# Patient Record
Sex: FEMALE | Race: WHITE
[De-identification: names, ages, dates, MRNs, and addresses within clinical notes are randomized per-mention and may not be internally consistent; named-entity substitution may affect disease eponyms.]

---

## 2018-01-16 ENCOUNTER — HOSPITAL ENCOUNTER (OUTPATIENT)
Dept: HOSPITAL 56 - MW.SDS | Age: 68
LOS: 1 days | Discharge: HOME | End: 2018-01-17
Attending: OBSTETRICS & GYNECOLOGY
Payer: MEDICARE

## 2018-01-16 DIAGNOSIS — Z79.899: ICD-10-CM

## 2018-01-16 DIAGNOSIS — R11.2: ICD-10-CM

## 2018-01-16 DIAGNOSIS — N84.0: ICD-10-CM

## 2018-01-16 DIAGNOSIS — E78.00: ICD-10-CM

## 2018-01-16 DIAGNOSIS — Z98.51: ICD-10-CM

## 2018-01-16 DIAGNOSIS — N81.3: Primary | ICD-10-CM

## 2018-01-16 DIAGNOSIS — Z79.82: ICD-10-CM

## 2018-01-16 DIAGNOSIS — Z98.890: ICD-10-CM

## 2018-01-16 LAB
CHLORIDE SERPL-SCNC: 108 MMOL/L (ref 98–110)
SODIUM SERPL-SCNC: 141 MMOL/L (ref 136–146)

## 2018-01-16 PROCEDURE — 86901 BLOOD TYPING SEROLOGIC RH(D): CPT

## 2018-01-16 PROCEDURE — 85025 COMPLETE CBC W/AUTO DIFF WBC: CPT

## 2018-01-16 PROCEDURE — 86850 RBC ANTIBODY SCREEN: CPT

## 2018-01-16 PROCEDURE — 36415 COLL VENOUS BLD VENIPUNCTURE: CPT

## 2018-01-16 PROCEDURE — 86900 BLOOD TYPING SEROLOGIC ABO: CPT

## 2018-01-16 PROCEDURE — 85027 COMPLETE CBC AUTOMATED: CPT

## 2018-01-16 PROCEDURE — 58263 VAG HYST W/T/O & VAG REPAIR: CPT

## 2018-01-16 PROCEDURE — 88307 TISSUE EXAM BY PATHOLOGIST: CPT

## 2018-01-16 PROCEDURE — 80048 BASIC METABOLIC PNL TOTAL CA: CPT

## 2018-01-16 RX ADMIN — ONDANSETRON PRN MG: 2 INJECTION, SOLUTION INTRAMUSCULAR; INTRAVENOUS at 15:27

## 2018-01-16 RX ADMIN — ONDANSETRON PRN MG: 2 INJECTION, SOLUTION INTRAMUSCULAR; INTRAVENOUS at 22:40

## 2018-01-16 RX ADMIN — PROMETHAZINE HYDROCHLORIDE PRN MG: 25 INJECTION INTRAMUSCULAR; INTRAVENOUS at 17:10

## 2018-01-16 NOTE — PCM.SN
- Free Text/Narrative


Note: 





Patient's pain is controlled overall--had emesis earlier, nausea is improving. 

VS stable overall--BPs mildly elevated.  Explained intraoperative findings, 

procedure. Continue postoperative cares and provide antiemetics as indicated.  

Plan removal of vaginal pack and hope in the morning.

## 2018-01-16 NOTE — PCM.OPNOTE
- General Post-Op/Procedure Note


Date of Surgery/Procedure: 01/16/18


Operative Procedure(s): TVH/BSO/anterior repair/culdoplasty/cystoscopy


Findings: 





3rd degree uterine prolapse and cystocele. Bilateral patent ureters


Pre Op Diagnosis: Incomplete uterovaginal prolapse


Post-Op Diagnosis: Same


Anesthesia Technique: General ET Tube


Primary Surgeon: Heather Alfaro


Assistant: Lilli Christensen


Pathology: 





uterus, tubes, ovaries, vaginal mucosa


Fluid Replacement, Intraop: 1,800


EBL in mLs: 200


Complications: none known


Condition: Good


Free Text/Narrative:: 





Dictation [id number]

## 2018-01-16 NOTE — OR
SURGEON:

Heather Alfaro M.D.

 

DATE OF PROCEDURE:  01/16/2018

 

PREOPERATIVE DIAGNOSIS:

Incomplete uterovaginal prolapse, symptomatic.

 

POSTOPERATIVE DIAGNOSIS:

Incomplete uterovaginal prolapse, symptomatic.

 

PROCEDURE:

1. Total vaginal hysterectomy and bilateral salpingo-oophorectomy.

2. Anterior colporrhaphy.

3. Olson's culdoplasty and cystoscopy.

 

ANESTHESIA:

General endotracheal anesthesia.

 

ASSISTANT:

Lilli Christensen M.D.

 

ESTIMATED BLOOD LOSS:

200 mL.

 

FLUIDS:

1800 mL crystalloid.

 

COMPLICATIONS:

None known.

 

FINDINGS:

Third-degree uterine prolapse and cystocele.  Bilateral patent ureters with

cystoscopy, post procedure.

 

DISPOSITION:

The patient to PACU in stable.

 

SPECIMENS:

Pathology.

 

INDICATIONS:

Elaina is a 67-year-old postmenopausal female, who has ongoing difficulties with

pelvic organ prolapse.  At this time, she would like to proceed with surgical

intervention.  Risks of the procedure have been discussed with her.  Proper

consent obtained.

 

The patient was taken to the operating room, where she underwent general

endotracheal anesthesia.  She was placed in modified dorsal position prepped and

draped in the usual sterile fashion.  SCDs lower extremities.  Yanes to gravity.

Received Ancef prophylactically.

 

A time-out was performed.  A heavyweight speculum was placed posteriorly to the

vagina, anterior [place].  Cervix was grasped with Kenroy clamp and circumscribed

with Bovie cautery.  Anterior and posterior and side wall mucosa was dissected

away from underlying peritoneum bluntly.  The posterior peritoneum was tented

downward and entered sharply.  A longer weighted speculum was replaced with the

shorter anteriorly.  A sharp dissection was performed with Metzenbaum scissors

to enter the anterior cul-de-sac.  [place] was now placed to mobilize the bladder

away from operative field.

 

Katie clamp was placed on either sides, along the uterosacral ligaments,

transected and suture ligated with 2-0 Vicryl.  Further pedicle on either side

was able be secured, transected, and suture ligated in the final pedicle on

either side, incorporating the utero tubo-ovarian pedicle was able to be

secured, transected, and suture ligated.  The uterus was handed off to scrub

tech.

 

The moist vaginal packing was now placed to mobilize the bowel away from

operative field.  The right infundibulopelvic ligament was able to be

identified.  The tube and ovary were grasped with a Jaskaran clamp and Z-clamp x2

was placed across the pedicle.  The pedicle was now transected and suture

ligated with 2-0 Vicryl on pass followed by suture.  The pedicle was inspected

and found to be hemostatic.

 

A similar process was performed on the patient's left side.  The

infundibulopelvic pelvic ligament was able to be identified, transected with the

Z-clamp x2, cut and suture tied x2.  These pedicles were closely inspected,

found to be hemostatic, and sutures trimmed.

 

The uterosacral ligament pedicle on either side was secured to the vaginal apex

in order to help decrease risk for enterocele forming.  Olson's culdoplasty was

performed.  The 2-0 Vicryl incorporating the left uterosacral ligament was able

to be reefed along the posterior peritoneum and then incorporating the right

uterosacral ligament in a similar fashion was performed on further posterior

reefing of the peritoneum just cephalad to this previous suture.  Attention was

now turned to performing the anterior repair as the pedicles appeared hemostatic

along the hysterectomy.

 

Along the midline, the anterior mucosa was able to be grasped with Allis clamps.

Hydrodissection was performed along the midline.  A sagittal midline anterior

mucosal incision was created using Metzenbaum scissors.  This was further

dissected.  The edges of the mucosa were now grasped on either side with serial

Allis clamps.  The overlying mucosa was gently dissected sharply and bluntly

from underlying muscularis tissue until a stronger lateral muscularis tissue was

able to be identified.  This tissue was now replicated using 2-0 Vicryl in

inverted mattress suture technique in order to reduce the cystocele defect.

 

Sutures trimmed, excess vaginal mucosa was trimmed.  The edges of mucosa were

now replicated using 0 Vicryl in continuous running locked fashion; one to the

level of the vaginal cuff.  The Olson's culdoplasty sutures were tied down x2.

The remainder of the vaginal mucosa was now closed along the cuff and the cuff

line was inspected and found to be hemostatic.  All sutures trimmed.  The Yanes

balloon was deflated, Yanes catheter removed, and cystoscope was introduced. IV

porcine with Lasix had been introduced via IV.  The bladder was able to be

visualized.  The dome of the bladder was found to be intact.  Trigone was

inspected.  The right ureteral orifice followed by the left ureteral orifice had

fluorescein dyed urine seen streaming from them, helping to ensure ureteral

patency.  Therefore, the bladder was drained.  Yanes catheter was replaced.  The

cuff line once again inspected and found to be hemostatic.  Vaginal packing was

placed.  The patient tolerated the procedure well.  She will go to PACU in

stable condition.  All specimens to pathology.  Sponge, instrument, and needle

counts were correct x2.

 

 

DEBBIE / MELE

DD:  01/16/2018 10:40:41

DT:  01/16/2018 16:05:50

Job #:  [id number]

## 2018-01-16 NOTE — PCM.PREANE
Preanesthetic Assessment





- Anesthesia/Transfusion/Family Hx


Anesthesia History: Prior Anesthesia Reaction


Type of Anesthesia Reaction: Excessive Nausea/Vomiting


Family History of Anesthesia Reaction: No


Transfusion History: No Prior Transfusion(s)


Intubation History: Unknown





- Review of Systems


General: No Symptoms


Pulmonary: No Symptoms


Cardiovascular: No Symptoms


Gastrointestinal: No Symptoms


Neurological: No Symptoms


Other: Reports: None





- Physical Assessment


O2 Sat by Pulse Oximetry: 95


Respiratory Rate: 16


Vital Signs: 





 Last Vital Signs











Temp  36.4 C   01/16/18 07:41


 


Pulse  74   01/16/18 07:41


 


Resp  16   01/16/18 07:41


 


BP  141/67 H  01/16/18 07:41


 


Pulse Ox  95   01/16/18 07:41











Height: 1.6 m


Weight: 71.668 kg


ASA Class: 2


Mental Status: Alert & Oriented x3


Airway Class: Mallampati = 2


Dentition: Reports: Normal Dentition


Thyro-Mental Finger Breadths: 3


Mouth Opening Finger Breadths: 3


ROM/Head Extension: Full


Lungs: Clear to Auscultation, Normal Respiratory Effort


Cardiovascular: Regular Rate, Regular Rhythm





- Allergies


Allergies/Adverse Reactions: 


 Allergies











Allergy/AdvReac Type Severity Reaction Status Date / Time


 


No Known Allergies Allergy   Verified 01/16/18 07:43














- Blood


Blood Available: No





- Anesthesia Plan


Pre-Op Medication Ordered: None





- Acknowledgements


Anesthesia Type Planned: General Anesthesia


Pt an Appropriate Candidate for the Planned Anesthesia: Yes


Alternatives and Risks of Anesthesia Discussed w Pt/Guardian: Yes


Pt/Guardian Understands and Agrees with Anesthesia Plan: Yes





PreAnesthesia Questionnaire


HEENT History: Reports: Glaucoma


Other HEENT History: wears glasses


Cardiovascular History: Reports: Other (See Below) (borderline 

hypercholesteronemia)


Gastrointestinal History: Reports: Other (See Below)


Other Gastrointestinal History: occasional heartburn


Genitourinary History: Reports: None


OB/GYN History: Reports: Pregnancy, Prolapsed Uterus





- Past Surgical History


Head Surgeries/Procedures: Reports: None


HEENT Surgical History: Reports: Oral Surgery


GI Surgical History: Reports: Colonoscopy (x2)


Female  Surgical History: Reports: Breast Biopsy, Tubal Ligation





- SUBSTANCE USE


Smoking Status *Q: Never Smoker


Recreational Drug Use History: No





- HOME MEDS


Home Medications: 


 Home Meds





Aspirin [[place] Aspirin] 81 mg PO DAILY 01/11/18 [History]


Brimonidine/Timolol [Combigan 0.2%/0.5% Ophth Soln] 1 drop EYEBOTH BID 01/11/18 

[History]


Ibuprofen 1 tab PO ASDIRECTED PRN 01/11/18 [History]


Latanoprost [Xalatan 0.005% Ophth Soln] 1 drop EYEBOTH BEDTIME 01/11/18 [History

]











- CURRENT (IN HOUSE) MEDS


Current Meds: 





 Current Medications





Fentanyl (Sublimaze)  50 mcg IVPUSH .Q5MIN PRN


   PRN Reason: Pain


Acetaminophen 1,000 mg/ Premix  100 mls @ 400 mls/hr IV .ONETIME GUALBERTO


   Last Admin: 01/16/18 08:02 Dose:  400 mls/hr


Scopolamine (Transderm-Scop)  1.5 mg TRDERM .ONCE PRN


   PRN Reason: Post Op Nausea


Sodium Chloride (Saline Flush)  10 ml FLUSH ASDIRECTED PRN


   PRN Reason: Keep Vein Open


Sodium Chloride (Saline Flush)  2.5 ml FLUSH ASDIRECTED PRN


   PRN Reason: Keep Vein Open





Discontinued Medications





Fentanyl (Sublimaze) Confirm Administered Dose 100 mcg .ROUTE .STK-MED ONE


   Stop: 01/16/18 07:38


Fluorescein Sodium (Ak-Fluor) Confirm Administered Dose 5 ml .ROUTE .STK-MED ONE


   Stop: 01/16/18 07:22


Hydromorphone HCl (Dilaudid) Confirm Administered Dose 2 mg .ROUTE .STK-MED ONE


   Stop: 01/16/18 07:39


Cefazolin Sodium/Dextrose 1 gm (/ Premix)  50 mls @ 100 mls/hr IV ONETIME ONE


   Stop: 01/16/18 05:29


Lidocaine (Xylocaine-Mpf 2%) Confirm Administered Dose 5 ml .ROUTE .STK-MED ONE


   Stop: 01/16/18 07:38


Midazolam HCl (Versed 1 Mg/Ml) Confirm Administered Dose 2 mg .ROUTE .STK-MED 

ONE


   Stop: 01/16/18 07:38


Ondansetron HCl (Zofran) Confirm Administered Dose 4 mg .ROUTE .STK-MED ONE


   Stop: 01/16/18 07:38


Propofol (Diprivan  20 Ml) Confirm Administered Dose 200 mg .ROUTE .STK-MED ONE


   Stop: 01/16/18 07:38


Rocuronium Bromide (Zemuron) Confirm Administered Dose 100 mg .ROUTE .[employer]-MED 

ONE


   Stop: 01/16/18 07:38

## 2018-01-17 LAB
CHLORIDE SERPL-SCNC: 99 MMOL/L (ref 98–110)
SODIUM SERPL-SCNC: 132 MMOL/L (ref 136–146)

## 2018-01-17 RX ADMIN — PROMETHAZINE HYDROCHLORIDE PRN MG: 25 INJECTION INTRAMUSCULAR; INTRAVENOUS at 00:53

## 2018-01-17 NOTE — PCM.SURGPN
- General Info


Date of Service: 01/17/18


POD#: 1


Functional Status: Reports: Pain Controlled, Tolerating Diet, Ambulating





- Review of Systems


General: Denies: Fever, Weakness


Pulmonary: Denies: Shortness of Breath


Cardiovascular: Denies: Chest Pain, Palpitations, Lightheadedness


Gastrointestinal: Denies: Nausea (had multiple emesis last night, but finally 

resolved and slept through the night. Had toast this morning), Vomiting


Genitourinary: Denies: Flank Pain


Neurological: Reports: No Symptoms


Psychiatric: Reports: No Symptoms





- Patient Data


Vitals - Most Recent: 


 Last Vital Signs











Temp  37.2 C   01/17/18 08:00


 


Pulse  93   01/17/18 08:00


 


Resp  16   01/17/18 08:00


 


BP  139/81   01/17/18 08:00


 


Pulse Ox  95   01/17/18 08:00











Weight - Most Recent: 71.668 kg


I&O - Last 24 Hours: 


 Intake & Output











 01/16/18 01/17/18 01/17/18





 22:59 06:59 14:59


 


Intake Total 2107 1360 


 


Output Total 925 2150 


 


Balance 1182 -790 











Lab Results Last 24 Hrs: 


 Laboratory Results - last 24 hr











  01/17/18 01/17/18 Range/Units





  04:58 04:58 


 


WBC  16.44 H   (4.0-11.0)  K/uL


 


RBC  4.01 L   (4.30-5.90)  M/uL


 


Hgb  11.7 L   (12.0-16.0)  g/dL


 


Hct  34.4 L   (36.0-46.0)  %


 


MCV  85.8   (80.0-98.0)  fL


 


MCH  29.2   (27.0-32.0)  pg


 


MCHC  34.0   (31.0-37.0)  g/dL


 


RDW Std Deviation  38.0   (28.0-62.0)  fl


 


RDW Coeff of Angella  12   (11.0-15.0)  %


 


Plt Count  316   (150-400)  K/uL


 


MPV  9.40   (7.40-12.00)  fL


 


Neut % (Auto)  82.7 H   (48.0-80.0)  %


 


Lymph % (Auto)  10.6 L   (16.0-40.0)  %


 


Mono % (Auto)  6.6   (0.0-15.0)  %


 


Eos % (Auto)  0.0   (0.0-7.0)  %


 


Baso % (Auto)  0.1   (0.0-1.5)  %


 


Neut # (Auto)  13.6 H   (1.4-5.7)  K/uL


 


Lymph # (Auto)  1.7   (0.6-2.4)  K/uL


 


Mono # (Auto)  1.1 H   (0.0-0.8)  K/uL


 


Eos # (Auto)  0.0   (0.0-0.7)  K/uL


 


Baso # (Auto)  0.0   (0.0-0.1)  K/uL


 


Nucleated RBC %  0.0   /100WBC


 


Nucleated RBCs #  0   K/uL


 


Sodium   132 L  (136-146)  mmol/L


 


Potassium   4.0  (3.5-5.1)  mmol/L


 


Chloride   99  ()  mmol/L


 


Carbon Dioxide   21  (21-31)  mmol/L


 


BUN   9  (6.0-23.0)  mg/dL


 


Creatinine   0.7  (0.6-1.5)  mg/dL


 


Est Cr Clr Drug Dosing   63.63  mL/min


 


Estimated GFR (MDRD)   > 60.0  ml/min


 


Glucose   121 H  ()  mg/dL


 


Calcium   9.1  (8.8-10.8)  mg/dL











Med Orders - Current: 


 Current Medications





Docusate Sodium (Colace)  100 mg PO BID [facility]


   Last Admin: 01/17/18 08:26 Dose:  100 mg


Fentanyl (Sublimaze)  50 mcg IVPUSH .Q5MIN PRN


   PRN Reason: Pain


Acetaminophen 1,000 mg/ Premix  100 mls @ 400 mls/hr IV .ONETIME [facility]


   Last Admin: 01/16/18 08:02 Dose:  400 mls/hr


Lactated Ringer's (Ringers, Lactated)  1,000 mls @ 125 mls/hr IV ASDIRECTED [facility]


   Last Admin: 01/17/18 03:52 Dose:  125 mls/hr


Ketorolac Tromethamine (Toradol)  15 mg IVPUSH Q6H PRN


   PRN Reason: Pain (severe 7-10)


   Stop: 01/21/18 10:27


   Last Admin: 01/17/18 00:53 Dose:  15 mg


Morphine Sulfate (Morphine)  2 mg IVPUSH Q2H PRN


   PRN Reason: Pain (severe 7-10)


   Last Admin: 01/16/18 18:51 Dose:  2 mg


Morphine Sulfate (Morphine)  4 mg IVPUSH Q2H PRN


   PRN Reason: Pain (severe 7-10)


Ondansetron HCl (Zofran)  4 mg IVPUSH Q6H PRN


   PRN Reason: Nausea/Vomiting


   Last Admin: 01/16/18 22:40 Dose:  4 mg


Oxycodone/Acetaminophen (Percocet 325-5 Mg)  1 tab PO Q4H PRN


   PRN Reason: Pain (moderate 4-6)


   Last Admin: 01/16/18 14:58 Dose:  1 tab


Oxycodone/Acetaminophen (Percocet 325-5 Mg)  2 tab PO Q4H PRN


   PRN Reason: Pain (moderate 4-6)


Promethazine HCl (Phenergan)  25 mg IM Q6H PRN


   PRN Reason: Nausea/Vomiting


   Last Admin: 01/17/18 00:53 Dose:  25 mg


Sodium Chloride (Saline Flush)  10 ml FLUSH ASDIRECTED PRN


   PRN Reason: Keep Vein Open


Sodium Chloride (Saline Flush)  2.5 ml FLUSH ASDIRECTED PRN


   PRN Reason: Keep Vein Open





Discontinued Medications





Dexamethasone (Dexamethasone) Confirm Administered Dose 20 mg .ROUTE .STK-MED 

ONE


   Stop: 01/16/18 09:23


Diphenhydramine HCl (Benadryl) Confirm Administered Dose 50 mg .ROUTE .STK-MED 

ONE


   Stop: 01/16/18 09:01


Epinephrine HCl (Adrenalin) Confirm Administered Dose 1 mg .ROUTE .STK-MED ONE


   Stop: 01/16/18 09:26


Fentanyl (Sublimaze) Confirm Administered Dose 100 mcg .ROUTE .STK-MED ONE


   Stop: 01/16/18 07:38


Fluorescein Sodium (Ak-Fluor) Confirm Administered Dose 5 ml .ROUTE .STK-MED ONE


   Stop: 01/16/18 07:22


Furosemide (Lasix) Confirm Administered Dose 40 mg .ROUTE .STK-MED ONE


   Stop: 01/16/18 09:43


Hydromorphone HCl (Dilaudid) Confirm Administered Dose 2 mg .ROUTE .STK-MED ONE


   Stop: 01/16/18 07:39


Cefazolin Sodium/Dextrose 1 gm (/ Premix)  50 mls @ 100 mls/hr IV ONETIME ONE


   Stop: 01/16/18 05:29


   Last Admin: 01/16/18 11:36 Dose:  Not Given


Ketorolac Tromethamine (Toradol)  30 mg IVPUSH ONETIME ONE


   Stop: 01/16/18 10:28


   Last Admin: 01/16/18 10:55 Dose:  30 mg


Lidocaine (Xylocaine-Mpf 2%) Confirm Administered Dose 5 ml .ROUTE .STK-MED ONE


   Stop: 01/16/18 07:38


Midazolam HCl (Versed 1 Mg/Ml) Confirm Administered Dose 2 mg .ROUTE .STK-MED 

ONE


   Stop: 01/16/18 07:38


Ondansetron HCl (Zofran) Confirm Administered Dose 4 mg .ROUTE .STK-MED ONE


   Stop: 01/16/18 07:38


Propofol (Diprivan  20 Ml) Confirm Administered Dose 200 mg .ROUTE .STK-MED ONE


   Stop: 01/16/18 07:38


Rocuronium Bromide (Zemuron) Confirm Administered Dose 100 mg .ROUTE .STK-MED 

ONE


   Stop: 01/16/18 07:38


Scopolamine (Transderm-Scop)  1.5 mg TRDERM .ONCE PRN


   PRN Reason: Post Op Nausea











- Exam


General: Alert, Oriented


Lungs: Normal Respiratory Effort


Cardiovascular: Regular Rate, Regular Rhythm


GI/Abdominal Exam: Normal Bowel Sounds, Soft, No Distention.  No: Guarding, 

Rigid


Extremities: No: Mary's Sign


Skin: Warm, Dry, Intact


Psy/Mental Status: Alert, Normal Affect


Physical Findings Comment:: 





Vaginal packing removed, patient tolerated well. 





- Problem List & Annotations


(1) Incomplete uterovaginal prolapse


SNOMED Code(s): 728773218


   Code(s): N81.2 - INCOMPLETE UTEROVAGINAL PROLAPSE   Status: Acute   Current 

Visit: Yes   





- Problem List Review


Problem List Initiated/Reviewed/Updated: Yes





- My Orders


Last 24 Hours: 


 Active Orders 24 hr











 Category Date Time Status


 


 Patient Status [ADT] Routine ADT  01/16/18 10:27 Active


 


 Antiembolic Devices [RC] PER UNIT ROUTINE Care  01/16/18 10:28 Active


 


 May Shower [RC] ASDIRECTED Care  01/16/18 10:27 Active


 


 Notify Provider Intake and Out [RC] ASDIRECTED Care  01/16/18 10:27 Active


 


 Notify Provider Vital Signs [RC] ASDIRECTED Care  01/16/18 10:27 Active


 


 RT Incentive Spirometry [RC] Q2HWA Care  01/16/18 10:27 Active


 


 Ready for Discharge [RC] PER UNIT ROUTINE Care  01/17/18 10:37 Ordered


 


 Up With Assistance [RC] PER UNIT ROUTINE Care  01/16/18 10:27 Active


 


 Up ad Shayy [RC] PER UNIT ROUTINE Care  01/16/18 10:27 Active


 


 Urinary Catheter Removal [RC] Per Unit Routine Care  01/16/18 10:27 Active


 


 Regular Diet [DIET] Diet  01/16/18 Lunch Active


 


 Acetaminophen/oxyCODONE [Percocet 325-5 MG] Med  01/16/18 10:27 Active





 1 tab PO Q4H PRN   


 


 Acetaminophen/oxyCODONE [Percocet 325-5 MG] Med  01/16/18 10:27 Active





 2 tab PO Q4H PRN   


 


 Docusate Sodium [Colace] Med  01/16/18 21:00 Active





 100 mg PO BID   


 


 Ketorolac [Toradol] Med  01/16/18 10:27 Active





 15 mg IVPUSH Q6H PRN   


 


 Lactated Ringers [Ringers, Lactated] 1,000 ml Med  01/16/18 10:30 Active





 IV ASDIRECTED   


 


 Morphine Med  01/16/18 10:27 Active





 2 mg IVPUSH Q2H PRN   


 


 Morphine Med  01/16/18 10:27 Active





 4 mg IVPUSH Q2H PRN   


 


 Ondansetron [Zofran] Med  01/16/18 10:27 Active





 4 mg IVPUSH Q6H PRN   


 


 Promethazine [Phenergan] Med  01/16/18 10:27 Active





 25 mg IM Q6H PRN   


 


 Perineal Care [OM.PC] Per Unit Routine Oth  01/16/18 10:28 Ordered


 


 Peripheral IV Discontinue [OM.PC] Routine Oth  01/16/18 10:27 Ordered


 


 Sequential Compression Device [OM.PC] Per Unit Routine Oth  01/16/18 10:27 

Ordered








 Medication Orders





Docusate Sodium (Colace)  100 mg PO BID GUALBERTO


   Last Admin: 01/17/18 08:26  Dose: 100 mg


   Admin: 01/16/18 20:06  Dose: 100 mg


Fentanyl (Sublimaze)  50 mcg IVPUSH .Q5MIN PRN


   PRN Reason: Pain


Acetaminophen 1,000 mg/ Premix  100 mls @ 400 mls/hr IV .ONETIME GUALBERTO


   Last Admin: 01/16/18 08:02  Dose: 400 mls/hr


Lactated Ringer's (Ringers, Lactated)  1,000 mls @ 125 mls/hr IV ASDIRECTED GUALBERTO


   Last Admin: 01/17/18 03:52  Dose: 125 mls/hr


   Infusion: 01/17/18 03:52  Dose: 125 mls/hr


   Admin: 01/16/18 20:06  Dose: 125 mls/hr


   Infusion: 01/16/18 19:39  Dose: 125 mls/hr


   Admin: 01/16/18 11:39  Dose: 125 mls/hr


Ketorolac Tromethamine (Toradol)  15 mg IVPUSH Q6H PRN


   PRN Reason: Pain (severe 7-10)


   Stop: 01/21/18 10:27


   Last Admin: 01/17/18 00:53  Dose: 15 mg


Morphine Sulfate (Morphine)  2 mg IVPUSH Q2H PRN


   PRN Reason: Pain (severe 7-10)


   Last Admin: 01/16/18 18:51  Dose: 2 mg


Morphine Sulfate (Morphine)  4 mg IVPUSH Q2H PRN


   PRN Reason: Pain (severe 7-10)


Ondansetron HCl (Zofran)  4 mg IVPUSH Q6H PRN


   PRN Reason: Nausea/Vomiting


   Last Admin: 01/16/18 22:40  Dose: 4 mg


   Admin: 01/16/18 15:27  Dose: 4 mg


Oxycodone/Acetaminophen (Percocet 325-5 Mg)  1 tab PO Q4H PRN


   PRN Reason: Pain (moderate 4-6)


   Last Admin: 01/16/18 14:58  Dose: 1 tab


Oxycodone/Acetaminophen (Percocet 325-5 Mg)  2 tab PO Q4H PRN


   PRN Reason: Pain (moderate 4-6)


Promethazine HCl (Phenergan)  25 mg IM Q6H PRN


   PRN Reason: Nausea/Vomiting


   Last Admin: 01/17/18 00:53  Dose: 25 mg


   Admin: 01/16/18 17:10  Dose: 25 mg


Sodium Chloride (Saline Flush)  10 ml FLUSH ASDIRECTED PRN


   PRN Reason: Keep Vein Open


Sodium Chloride (Saline Flush)  2.5 ml FLUSH ASDIRECTED PRN


   PRN Reason: Keep Vein Open











- Assessment


Assessment           (Free Text/Narrative):: 





POD 1 status post TVH/anterior repair/culdoplasty/cystoscopy


Nausea with vomiting--resolved this am





- Plan


Plan                        (Free Text/Narrative):: 





Vaginal pack removed, remove hope catheter. Ambulate halls this am. Once able 

to ambulate and void, may be discharged to home. Discharge to home, discharge 

instructions reviewed.  Infection and bleeding warnings reviewed. Follow up at 

[facility] 2 and 6 weeks.

## 2019-07-09 ENCOUNTER — HOSPITAL ENCOUNTER (OUTPATIENT)
Dept: HOSPITAL 56 - MW.SDS | Age: 69
Discharge: HOME | End: 2019-07-09
Attending: SURGERY
Payer: MEDICARE

## 2019-07-09 DIAGNOSIS — H40.9: ICD-10-CM

## 2019-07-09 DIAGNOSIS — Z12.11: Primary | ICD-10-CM

## 2019-07-09 DIAGNOSIS — K57.30: ICD-10-CM

## 2019-07-09 NOTE — PCM.OPNOTE
- General Post-Op/Procedure Note


Date of Surgery/Procedure: 07/09/19


Operative Procedure(s): COlonoscopy


Findings: 


Diverticulosis of sigmoid colon 


Pre Op Diagnosis: Screening colonoscopy


Post-Op Diagnosis: diverticulosis


Anesthesia Technique: MAC


Primary Surgeon: Tiffany Jones


Condition: Good


Free Text/Narrative:: 


 Intake & Output











 07/09/19 07/09/19 07/09/19





 06:59 14:59 22:59


 


Intake Total  1500 


 


Balance  1500

## 2019-07-09 NOTE — PCM.PREANE
Preanesthetic Assessment





- Anesthesia/Transfusion/Family Hx


Anesthesia History: Prior Anesthesia Reaction


Type of Anesthesia Reaction: Excessive Nausea/Vomiting


Family History of Anesthesia Reaction: No


Transfusion History: No Prior Transfusion(s)


Intubation History: Unknown





- Review of Systems


General: No Symptoms


Pulmonary: No Symptoms


Cardiovascular: No Symptoms


Gastrointestinal: No Symptoms, Other (screening)


Neurological: No Symptoms


Other: Reports: None





- Physical Assessment


O2 Sat by Pulse Oximetry: 95


Respiratory Rate: 16


Vital Signs: 





 Last Vital Signs











Temp  36.3 C   07/09/19 11:17


 


Pulse  66   07/09/19 11:17


 


Resp  16   07/09/19 11:17


 


BP  120/63   07/09/19 11:17


 


Pulse Ox  95   07/09/19 11:17











Height: 5 ft 3 in


Weight: 70.307 kg


ASA Class: 2


Mental Status: Alert & Oriented x3


Airway Class: Mallampati = 2


Dentition: Reports: Normal Dentition


Thyro-Mental Finger Breadths: 2


Mouth Opening Finger Breadths: 3


ROM/Head Extension: Limited/Partial


Lungs: Clear to Auscultation, Normal Respiratory Effort


Cardiovascular: Regular Rate, Regular Rhythm





- Allergies


Allergies/Adverse Reactions: 


 Allergies











Allergy/AdvReac Type Severity Reaction Status Date / Time


 


No Known Allergies Allergy   Verified 07/02/19 14:22














- Blood


Blood Available: No





- Anesthesia Plan


Pre-Op Medication Ordered: None





- Acknowledgements


Anesthesia Type Planned: MAC


Pt an Appropriate Candidate for the Planned Anesthesia: Yes


Alternatives and Risks of Anesthesia Discussed w Pt/Guardian: Yes


Pt/Guardian Understands and Agrees with Anesthesia Plan: Yes





PreAnesthesia Questionnaire


HEENT History: Reports: Glaucoma, Other (See Below)


Other HEENT History: wears glasses


Cardiovascular History: Reports: Other (See Below)


Gastrointestinal History: Reports: Other (See Below)


Other Gastrointestinal History: occasional heartburn


Genitourinary History: Reports: None


OB/GYN History: Reports: Pregnancy


Musculoskeletal History: Reports: Other (See Below)


Other Musculoskeletal History: hx of Sciatica


Neurological History: Reports: Other (See Below)


Other Neuro History: hx of motion sickness





- Past Surgical History


Head Surgeries/Procedures: Reports: None


HEENT Surgical History: Reports: Oral Surgery


GI Surgical History: Reports: Colonoscopy (x2, last one about 10 years ago - 

normal)


Female  Surgical History: Reports: Hysterectomy





- SUBSTANCE USE


Smoking Status *Q: Never Smoker


Recreational Drug Use History: No





- HOME MEDS


Home Medications: 


 Home Meds





Brimonidine/Timolol [Combigan 0.2%/0.5% Ophth Soln] 1 drop EYEBOTH BID 01/11/18 

[History]


Latanoprost [Xalatan 0.005% Ophth Soln] 1 drop EYEBOTH BEDTIME 01/11/18 [History

]


Scopolamine [Transderm-Scop] 1 each TD ASDIRECTED PRN 07/09/19 [History]











- CURRENT (IN HOUSE) MEDS


Current Meds: 





 Current Medications





Lactated Ringer's (Ringers, Lactated)  1,000 mls @ 125 mls/hr IV ASDIRECTED GUALBERTO


   Last Admin: 07/09/19 11:14 Dose:  125 mls/hr


Sodium Chloride (Saline Flush)  10 ml FLUSH ASDIRECTED PRN


   PRN Reason: Keep Vein Open


Sodium Chloride (Saline Flush)  2.5 ml FLUSH ASDIRECTED PRN


   PRN Reason: Keep Vein Open


Sodium Chloride (Saline Flush)  10 ml FLUSH ASDIRECTED PRN


   PRN Reason: Keep Vein Open


Sodium Chloride (Saline Flush)  2.5 ml FLUSH ASDIRECTED PRN


   PRN Reason: Keep Vein Open


Sodium Chloride (Normal Saline)  10 ml IV ASDIRECTED PRN


   PRN Reason: IV Use





Discontinued Medications





Fentanyl (Sublimaze) Confirm Administered Dose 100 mcg .ROUTE .STK-MED ONE


   Stop: 07/09/19 11:09


Lidocaine (Xylocaine-Mpf 2%) Confirm Administered Dose 5 ml .ROUTE .STK-MED ONE


   Stop: 07/09/19 11:09


Propofol (Diprivan  20 Ml) Confirm Administered Dose 400 mg .ROUTE .STK-MED ONE


   Stop: 07/09/19 11:09

## 2019-07-10 NOTE — OR
SURGEON:

TIFFANY JONES MD

 

DATE OF PROCEDURE:  07/09/2019

 

PREOPERATIVE DIAGNOSIS:

Screening colonoscopy.

 

POSTOPERATIVE DIAGNOSIS:

Diverticulosis.

 

PROCEDURE PERFORMED:

Screening colonoscopy.

 

PRIMARY SURGEON:

Endoscopist, Tiffany Jones MD.

 

ANESTHESIA:

MAC.

 

INSTRUMENT USED:

Olympus colonoscope.

 

EXTENT OF EXAM:

To the cecum.

 

PREPARATION:

Good.

 

LIMITATIONS:

None.

 

INDICATION FOR EXAMINATION:

The patient is a 69-year-old female who returns for repeat colonoscopy.  I

explained the procedure, expected perioperative course, and risks including

bleeding, infection, or damage to surrounding structures including perforation.

The patient verbalized understanding and wishes to proceed.

 

PROCEDURE IN DETAIL:

The patient was brought into the endoscopy suite and placed in a left lateral

decubitus position.  A time-out was completed verifying the patient's name, age,

date of birth, allergies, and procedure to be performed.  Monitored anesthesia

care was induced and continuous oxygen was provided via nasal cannula throughout

the procedure.  After adequate sedation was achieved, a digital rectal exam was

performed.  This exam was within normal limits.  A well lubricated colonoscope

was inserted into the rectum and advanced under direct visualization to the

level of cecum.  The cecum was identified by both visual and anatomic landmarks.

A photograph was taken of the cecal cap as well as with the scope retroflexed

within the cecum.  The scope was then fully withdrawn while examining the color,

texture, anatomy, and integrity of the mucosa from the cecum to the anal canal.

The patient was found to have scattered diverticula throughout the sigmoid

colon.  The scope was then brought into the rectum and retroflexed to allow

visualization of the anal canal opening.  This appeared normal and a photograph

was taken.  The scope was then straightened out and fully withdrawn.  The cecum

to anus time was 6 minutes.  The patient tolerated the procedure well and was

taken to the PACU in stable condition.

 

ENDOSCOPIC DIAGNOSIS:

Diverticulosis.

 

RECOMMENDATIONS:

The patient can follow up in 2 weeks to discuss this diagnosis.  Otherwise, she

will not need any further colonoscopies unless she has changes in her bowel

habits.

 

 

PIPER SHABAZZ

DD:  07/10/2019 12:28:24

DT:  07/10/2019 14:27:21

Job #:  [id number]

## 2023-02-09 NOTE — PCM.POSTAN
History  Chief Complaint   Patient presents with   • Flank Pain     Patient states flank pain for 2 days  Denies trouble going to bathroom  Denies CP/ SOB/ abd pain, N/V/D/F  States has a headache for 2 days and blood sugar was high  79y F here for multiple complaints  Reports left flank / upper abd pain for the last couple of days  Denies any specific injury or inciting event  Pain is relatively constant, achy, nothing makes it better / worse  +anorexia, no n/v  Denies dysuria, frequency or urgency  ?mildly constipated -having bms but only passing small amts of stool  Hx of colitis  Additionally has a hx of chronic, recurrent headaches and cranial neuralgia  Follows w/ neurology and hasn't been having much trouble with these lately, but also reports these symptoms for the last couple of days  Reports pain/sensitivity to the left side of the scalp w/ some radiation down the left side of the face, around the cheek and over to the nose  Same as the symptoms she follows w/ neurology for  Additionally, c/o generalized headache w/ some radiation into the forehead / bl eyes assoc w some photophobia  Has been taking her regular medications as prescribed and acetaminophen w/o relief  Denies f/c/s, no double vision, no changes in speech, no numbness or weakness   No other co      History provided by:  Patient   used: No    Flank Pain  Pain location:  L flank  Pain quality: aching    Pain radiates to:  LUQ  Pain severity:  Moderate  Onset quality:  Gradual  Timing:  Constant  Progression:  Unchanged  Chronicity:  New  Context: not recent illness, not sick contacts and not suspicious food intake    Relieved by:  Nothing  Worsened by:  Palpation  Ineffective treatments:  None tried  Associated symptoms: anorexia, constipation and flatus    Associated symptoms: no chest pain, no chills, no diarrhea, no fatigue, no fever, no hematuria, no nausea and no vomiting    Risk factors: has not POST ANESTHESIA ASSESSMENT





- MENTAL STATUS


Mental Status: Alert, Oriented





- RESPIRATORY


Respiratory Status: Respiratory Rate WNL, Airway Patent, O2 Saturation Stable





- CARDIOVASCULAR


CV Status: Pulse Rate WNL, Blood Pressure Stable





- GASTROINTESTINAL


GI Status: No Symptoms





- PAIN


Pain Score: 0





- POST OP HYDRATION


Hydration Status: Adequate & Stable





- OBSERVATIONS


Free Text/Narrative:: 





no anesthesia problems had multiple surgeries        Prior to Admission Medications   Prescriptions Last Dose Informant Patient Reported? Taking? Cholecalciferol (VITAMIN D3) 2000 units capsule   Yes No   Sig: Take 2,000 Units by mouth daily    Incontinence Supply Disposable (SELECT DISPOSABLE UNDERWEAR LG) MISC   Yes No   Lancets (ACCU-CHEK SOFT TOUCH) lancets   No No   Sig: Testing 1 time daily  Dx: E11 9   Misc  Devices (Transport Chair) MISC   No No   Sig: Use if needed (with outings outside of the house )   acetaminophen (TYLENOL) 325 mg tablet   No No   Sig: Take 2 tablets (650 mg total) by mouth every 6 (six) hours as needed for mild pain   amLODIPine (NORVASC) 5 mg tablet   No No   Sig: TAKE 1 TABLET (5 MG TOTAL) BY MOUTH DAILY   aspirin 81 MG tablet   Yes No   Sig: Take 81 mg by mouth daily  dorzolamide (TRUSOPT) 2 % ophthalmic solution   Yes No   Sig: Administer 1 drop to both eyes 3 (three) times a day     famotidine (PEPCID) 20 mg tablet   No No   Sig: Take 1 tablet (20 mg total) by mouth 2 (two) times a day   furosemide (LASIX) 20 mg tablet   No No   Sig: TAKE 1 TABLET IN THE MORNING  AND TAKE 2 TABLETS IN THE EVENING Do not start before January 3, 2023  gabapentin (NEURONTIN) 600 MG tablet   No No   Sig: PT to take 1 tab twice daily as she forgets to take the 1/2 in the afternoon anyway  latanoprost (XALATAN) 0 005 % ophthalmic solution   No No   Sig: Apply 1 drop to eye daily at bedtime Both eyes   levothyroxine 75 mcg tablet   No No   Sig: TAKE 1 TABLET EVERY DAY   oxygen gas   Yes No   Sig: Inhale 2 L/min daily at bedtime   Indications: Shortness of breath   pantoprazole (PROTONIX) 40 mg tablet   No No   Sig: Take 1 tablet (40 mg total) by mouth daily   potassium chloride (K-DUR,KLOR-CON) 20 mEq tablet   No No   Sig: TAKE 1 TABLET EVERY DAY   Patient taking differently: 20 mEq if needed   psyllium (METAMUCIL SMOOTH TEXTURE) 28 % packet   No No   Sig: Take 1 packet by mouth in the morning   Patient taking differently: Take 1 packet by mouth if needed   sulfaSALAzine (AZULFIDINE-EN) 500 mg EC tablet   No No   Sig: Take 1 tablet (500 mg total) by mouth 2 (two) times a day   vitamin B-12 (CYANOCOBALAMIN) 250 MCG tablet   Yes No   Sig: Take 250 mcg by mouth daily      Facility-Administered Medications: None       Past Medical History:   Diagnosis Date   • Asthma    • Atypical chest pain    • CAD (coronary artery disease)    • Candidal intertrigo    • CHF (congestive heart failure) (HCC)    • Depression    • Diabetes mellitus (HCC)    • Diabetic neuropathy (HCC)    • Diverticulitis    • Dyslipidemia    • Female bladder prolapse    • Gastric ulcer    • Glaucoma    • HTN (hypertension)    • Hyperlipidemia    • Hypothyroidism 4/18/2016   • RAD (reactive airway disease)        Past Surgical History:   Procedure Laterality Date   • COLONOSCOPY     • ESOPHAGOGASTRODUODENOSCOPY  2011   • HYSTERECTOMY     • TONSILLECTOMY         Family History   Problem Relation Age of Onset   • Breast cancer Mother    • Hypothyroidism Mother    • Hypertension Father    • Breast cancer Sister    • Thyroid disease Sister    • Hypertension Sister      I have reviewed and agree with the history as documented  E-Cigarette/Vaping   • E-Cigarette Use Never User      E-Cigarette/Vaping Substances   • Nicotine No    • THC No    • CBD No    • Flavoring No    • Other No    • Unknown No      Social History     Tobacco Use   • Smoking status: Never   • Smokeless tobacco: Never   Vaping Use   • Vaping Use: Never used   Substance Use Topics   • Alcohol use: No     Comment: Social Alcohol Use -- as per allscripts (pt denies all alcohol use)   • Drug use: No       Review of Systems   Constitutional: Negative for chills, fatigue and fever  Cardiovascular: Negative for chest pain  Gastrointestinal: Positive for anorexia, constipation and flatus  Negative for diarrhea, nausea and vomiting  Genitourinary: Positive for flank pain  Negative for hematuria  All other systems reviewed and are negative  Physical Exam  Physical Exam  Vitals and nursing note reviewed  Constitutional:       Appearance: Normal appearance  HENT:      Mouth/Throat:      Mouth: Mucous membranes are moist    Eyes:      Extraocular Movements: Extraocular movements intact  Conjunctiva/sclera: Conjunctivae normal       Pupils: Pupils are equal, round, and reactive to light  Cardiovascular:      Rate and Rhythm: Normal rate and regular rhythm  Pulmonary:      Effort: Pulmonary effort is normal       Breath sounds: Normal breath sounds  Abdominal:      General: Bowel sounds are normal  There is no distension  Palpations: Abdomen is soft  Tenderness: There is abdominal tenderness in the left upper quadrant  There is no right CVA tenderness, left CVA tenderness, guarding or rebound  Negative signs include Rovsing's sign  Musculoskeletal:         General: No swelling  Cervical back: Normal range of motion and neck supple  Skin:     General: Skin is warm  Neurological:      General: No focal deficit present  Mental Status: She is alert and oriented to person, place, and time     Psychiatric:         Mood and Affect: Mood normal          Vital Signs  ED Triage Vitals [02/08/23 1941]   Temperature Pulse Respirations Blood Pressure SpO2   97 8 °F (36 6 °C) 96 18 130/60 96 %      Temp src Heart Rate Source Patient Position - Orthostatic VS BP Location FiO2 (%)   -- Monitor Lying Right arm --      Pain Score       8           Vitals:    02/08/23 1941 02/08/23 2227 02/08/23 2330   BP: 130/60 150/63 132/60   Pulse: 96 79 80   Patient Position - Orthostatic VS: Lying Lying Lying         Visual Acuity      ED Medications  Medications   sodium chloride 0 9 % bolus 1,000 mL (0 mL Intravenous Stopped 2/8/23 2343)   dexamethasone (DECADRON) injection 8 mg (8 mg Intravenous Given 2/8/23 2221)   metoclopramide (REGLAN) injection 10 mg (10 mg Intravenous Given 2/8/23 2224)   magnesium sulfate 2 g/50 mL IVPB (premix) 2 g (0 g Intravenous Stopped 2/8/23 2344)   iohexol (OMNIPAQUE) 350 MG/ML injection (SINGLE-DOSE) 100 mL (100 mL Intravenous Given 2/8/23 2211)       Diagnostic Studies  Results Reviewed     Procedure Component Value Units Date/Time    Basic metabolic panel [[id number]]  (Abnormal) Collected: 02/08/23 2121    Lab Status: Final result Specimen: Blood from Arm, Right Updated: 02/08/23 2153     Sodium 130 mmol/L      Potassium 4 5 mmol/L      Chloride 97 mmol/L      CO2 25 mmol/L      ANION GAP 8 mmol/L      BUN 16 mg/dL      Creatinine 1 09 mg/dL      Glucose 178 mg/dL      Calcium 9 2 mg/dL      eGFR 41 ml/min/1 73sq m     Narrative:      [place] guidelines for Chronic Kidney Disease (CKD):   •  Stage 1 with normal or high GFR (GFR > 90 mL/min/1 73 square meters)  •  Stage 2 Mild CKD (GFR = 60-89 mL/min/1 73 square meters)  •  Stage 3A Moderate CKD (GFR = 45-59 mL/min/1 73 square meters)  •  Stage 3B Moderate CKD (GFR = 30-44 mL/min/1 73 square meters)  •  Stage 4 Severe CKD (GFR = 15-29 mL/min/1 73 square meters)  •  Stage 5 End Stage CKD (GFR <15 mL/min/1 73 square meters)  Note: GFR calculation is accurate only with a steady state creatinine    Urine Macroscopic, POC [[id number]] Collected: 02/08/23 2150    Lab Status: Final result Specimen: Urine Updated: 02/08/23 2151     Color, UA Yellow     Clarity, UA Slightly Cloudy     pH, UA 7 0     Leukocytes, UA Negative     Nitrite, UA Negative     Protein, UA Negative mg/dl      Glucose, UA Negative mg/dl      Ketones, UA Negative mg/dl      Urobilinogen, UA 0 2 E U /dl      Bilirubin, UA Negative     Occult Blood, UA Negative     Specific Gravity, UA 1 020    Narrative:      CLINITEK RESULT    CBC and differential [[id number]]  (Abnormal) Collected: 02/08/23 2121    Lab Status: Final result Specimen: Blood from Arm, Right Updated: 02/08/23 2126     WBC 4 26 Thousand/uL      RBC 4 10 Million/uL Hemoglobin 12 3 g/dL      Hematocrit 37 4 %      MCV 91 fL      MCH 30 0 pg      MCHC 32 9 g/dL      RDW 12 9 %      MPV 10 5 fL      Platelets 509 Thousands/uL      nRBC 0 /100 WBCs      Neutrophils Relative 65 %      Immat GRANS % 1 %      Lymphocytes Relative 16 %      Monocytes Relative 15 %      Eosinophils Relative 2 %      Basophils Relative 1 %      Neutrophils Absolute 2 79 Thousands/µL      Immature Grans Absolute 0 02 Thousand/uL      Lymphocytes Absolute 0 70 Thousands/µL      Monocytes Absolute 0 63 Thousand/µL      Eosinophils Absolute 0 08 Thousand/µL      Basophils Absolute 0 04 Thousands/µL     Fingerstick Glucose (POCT) [[id number]]  (Abnormal) Collected: 02/08/23 1945    Lab Status: Final result Updated: 02/08/23 1947     POC Glucose 162 mg/dl                  CT abdomen pelvis with contrast   Final Result by Jori Galaviz DO (02/08 2240)      Diffuse thickening of the colonic wall as described above likely representing colitis  Follow-up with gastroenterology is recommended  Thickening of the urinary bladder wall which may represent cystitis  Follow-up with urology is recommended  The study was marked in [facility] for immediate notification  Workstation performed: [id number]                    Procedures  Procedures         ED Course  ED Course as of 02/09/23 0001   Wed Feb 08, 2023   2300 Pt w/ mild colitis on CT  Normal VS, no sig elevated / decreased wbc, no bands/shift  Given age, do not feel aggressive tx w/ abx is warranted  D/w pt, pt feels comfortable w/ plan and going home - no need for admission at this time  SBIRT 22yo+    Flowsheet Row Most Recent Value   SBIRT (23 yo +)    In order to provide better care to our patients, we are screening all of our patients for alcohol and drug use  Would it be okay to ask you these screening questions?  No Filed at: 02/08/2023 2137                    Medical Decision Making  89y F w/ abd pain and acute exacerbation of chronic recurrent ha / cranial neuralgia  ddx for abd pain includes colitis, divertic, uti/pyelo, pud/gastritis, cardiopulm source unlikely  Will ck labs, ct a/p  Will tx HA w/ migraine cocktail (w/o benadryl), fluid hydration and re-eval    Colitis: acute illness or injury that poses a threat to life or bodily functions  Headache: chronic illness or injury with exacerbation, progression, or side effects of treatment  Amount and/or Complexity of Data Reviewed  Labs: ordered  Radiology: ordered  Decision-making details documented in ED Course  Risk  Prescription drug management  Decision regarding hospitalization  Disposition  Final diagnoses:   Headache   Colitis     Time reflects when diagnosis was documented in both MDM as applicable and the Disposition within this note     Time User Action Codes Description Comment    2/8/2023 11:20 PM Edita Marie Add [R51 9] Headache     2/8/2023 11:20 PM Edita Marie Add [R10 9] Left flank pain     2/8/2023 11:20 PM Edita Marie Remove [R10 9] Left flank pain     2/8/2023 11:21 PM Yina Patel L Add [K52 9] Colitis       ED Disposition     ED Disposition   Discharge    Condition   Stable    Date/Time   Wed Feb 8, 2023 11:19 PM    Comment   Eh Meza discharge to home/self care                 Follow-up Information     Follow up With Specialties Details Why [address], [place] [facility], Physician Assistant Schedule an appointment as soon as possible for a visit  If symptoms worsen or if no improvement [address] [place] [id number] [phone]            Current Discharge Medication List      CONTINUE these medications which have NOT CHANGED    Details   acetaminophen (TYLENOL) 325 mg tablet Take 2 tablets (650 mg total) by mouth every 6 (six) hours as needed for mild pain  Qty: 20 tablet, Refills: 0    Associated Diagnoses: Pyelonephritis      amLODIPine (NORVASC) 5 mg tablet TAKE 1 TABLET (5 MG TOTAL) BY MOUTH DAILY  Qty: 90 tablet, Refills: 3    Associated Diagnoses: Nonrheumatic aortic valve stenosis      aspirin 81 MG tablet Take 81 mg by mouth daily  Cholecalciferol (VITAMIN D3) 2000 units capsule Take 2,000 Units by mouth daily       dorzolamide (TRUSOPT) 2 % ophthalmic solution Administer 1 drop to both eyes 3 (three) times a day        famotidine (PEPCID) 20 mg tablet Take 1 tablet (20 mg total) by mouth 2 (two) times a day  Qty: 180 tablet, Refills: 3    Associated Diagnoses: Gastroesophageal reflux disease without esophagitis      furosemide (LASIX) 20 mg tablet TAKE 1 TABLET IN THE MORNING  AND TAKE 2 TABLETS IN THE EVENING Do not start before January 3, 2023  Qty: 270 tablet, Refills: 0    Associated Diagnoses: Nonrheumatic aortic valve stenosis      gabapentin (NEURONTIN) 600 MG tablet PT to take 1 tab twice daily as she forgets to take the 1/2 in the afternoon anyway  Qty: 180 tablet, Refills: 1    Associated Diagnoses: Cranial neuralgia      Incontinence Supply Disposable (SELECT DISPOSABLE UNDERWEAR LG) MISC       Lancets (ACCU-CHEK SOFT TOUCH) lancets Testing 1 time daily  Dx: E11 9  Qty: 100 each, Refills: 3    Associated Diagnoses: DM type 2 with diabetic peripheral neuropathy (HCC)      latanoprost (XALATAN) 0 005 % ophthalmic solution Apply 1 drop to eye daily at bedtime Both eyes  Qty: 7 5 mL, Refills: 1    Associated Diagnoses: Open-angle glaucoma, severe stage, unspecified laterality, unspecified open-angle glaucoma type      levothyroxine 75 mcg tablet TAKE 1 TABLET EVERY DAY  Qty: 90 tablet, Refills: 3    Associated Diagnoses: Hypothyroidism, unspecified type      Misc  Devices (Transport Chair) MISC Use if needed (with outings outside of the house )  Qty: 1 each, Refills: 0    Associated Diagnoses: Ambulatory dysfunction; Vertigo; Fall, subsequent encounter; Peripheral vascular disease ([address] );  Chronic diastolic congestive heart failure ([facility] [address] ); Osteoarthritis of multiple joints, unspecified osteoarthritis type; Shakiness      oxygen gas Inhale 2 L/min daily at bedtime  Indications: Shortness of breath      pantoprazole (PROTONIX) 40 mg tablet Take 1 tablet (40 mg total) by mouth daily  Qty: 90 tablet, Refills: 3    Associated Diagnoses: Gastroesophageal reflux disease without esophagitis; Epigastric pain      potassium chloride (K-DUR,KLOR-CON) 20 mEq tablet TAKE 1 TABLET EVERY DAY  Qty: 90 tablet, Refills: 0    Associated Diagnoses: Nonrheumatic aortic valve stenosis      psyllium (METAMUCIL SMOOTH TEXTURE) 28 % packet Take 1 packet by mouth in the morning  Qty: 30 packet, Refills: 11    Associated Diagnoses: Irritable bowel syndrome with both constipation and diarrhea      sulfaSALAzine (AZULFIDINE-EN) 500 mg EC tablet Take 1 tablet (500 mg total) by mouth 2 (two) times a day  Qty: 60 tablet, Refills: 1    Associated Diagnoses: Colitis      vitamin B-12 (CYANOCOBALAMIN) 250 MCG tablet Take 250 mcg by mouth daily             No discharge procedures on file      PDMP Review       Value Time User    PDMP Reviewed  Yes 11/18/2020  1:31 PM Apolinar Mehta PA-C          ED Provider  Electronically Signed by           Erin Pinto DO  02/09/23 3843